# Patient Record
Sex: FEMALE | Race: WHITE | NOT HISPANIC OR LATINO | Employment: OTHER | URBAN - METROPOLITAN AREA
[De-identification: names, ages, dates, MRNs, and addresses within clinical notes are randomized per-mention and may not be internally consistent; named-entity substitution may affect disease eponyms.]

---

## 2018-08-22 ENCOUNTER — IMPORTED ENCOUNTER (OUTPATIENT)
Dept: URBAN - METROPOLITAN AREA CLINIC 43 | Facility: CLINIC | Age: 43
End: 2018-08-22

## 2018-09-05 ENCOUNTER — IMPORTED ENCOUNTER (OUTPATIENT)
Dept: URBAN - METROPOLITAN AREA CLINIC 43 | Facility: CLINIC | Age: 43
End: 2018-09-05

## 2018-11-06 ENCOUNTER — IMPORTED ENCOUNTER (OUTPATIENT)
Dept: URBAN - METROPOLITAN AREA CLINIC 43 | Facility: CLINIC | Age: 43
End: 2018-11-06

## 2018-12-12 ENCOUNTER — IMPORTED ENCOUNTER (OUTPATIENT)
Dept: URBAN - METROPOLITAN AREA CLINIC 43 | Facility: CLINIC | Age: 43
End: 2018-12-12

## 2018-12-12 PROBLEM — H25.13: Noted: 2018-12-12

## 2018-12-12 PROBLEM — H16.223: Noted: 2018-12-12

## 2018-12-13 ENCOUNTER — PREPPED CHART (OUTPATIENT)
Dept: URBAN - METROPOLITAN AREA CLINIC 32 | Facility: CLINIC | Age: 43
End: 2018-12-13

## 2020-04-18 ASSESSMENT — TONOMETRY
OD_IOP_MMHG: 13.0
OS_IOP_MMHG: 12.0

## 2020-04-18 ASSESSMENT — VISUAL ACUITY
OS_SC: 20/200
OD_CC: J1+
OD_SC: J1+
OS_CC: J1+
OS_SC: J1+
OD_CC: 20/20
OS_CC: 20/20
OD_SC: 20/200

## 2020-04-18 ASSESSMENT — KERATOMETRY
OS_AXISANGLE_DEGREES: 85
OD_AXISANGLE_DEGREES: 92
OD_K2POWER_DIOPTERS: 45.5
OS_K2POWER_DIOPTERS: 45.25
OS_K1POWER_DIOPTERS: 44.5
OD_AXISANGLE2_DEGREES: 2
OD_K1POWER_DIOPTERS: 44.75
OS_AXISANGLE2_DEGREES: 175

## 2020-08-21 ASSESSMENT — KERATOMETRY
OD_AXISANGLE_DEGREES: 92
OS_AXISANGLE2_DEGREES: 175
OS_K2POWER_DIOPTERS: 45.25
OD_K2POWER_DIOPTERS: 45.5
OD_K1POWER_DIOPTERS: 44.75
OS_K1POWER_DIOPTERS: 44.5
OS_AXISANGLE_DEGREES: 85
OD_AXISANGLE2_DEGREES: 2

## 2020-08-24 ENCOUNTER — CONTACT LENS EXAM ESTABLISHED (OUTPATIENT)
Dept: URBAN - METROPOLITAN AREA CLINIC 32 | Facility: CLINIC | Age: 45
End: 2020-08-24

## 2020-08-24 DIAGNOSIS — H25.13: ICD-10-CM

## 2020-08-24 PROCEDURE — 92014 COMPRE OPH EXAM EST PT 1/>: CPT

## 2020-08-24 PROCEDURE — 92015 DETERMINE REFRACTIVE STATE: CPT

## 2020-08-24 PROCEDURE — 92310-1 LEVEL 1 CONTACT LENS MANAGEMENT

## 2020-08-24 ASSESSMENT — KERATOMETRY
OD_AXISANGLE2_DEGREES: 96
OD_AXISANGLE_DEGREES: 92
OS_AXISANGLE2_DEGREES: 175
OS_K2POWER_DIOPTERS: 45.25
OD_K1POWER_DIOPTERS: 44.75
OD_AXISANGLE2_DEGREES: 2
OS_AXISANGLE_DEGREES: 174
OD_K2POWER_DIOPTERS: 45.5
OS_AXISANGLE2_DEGREES: 84
OS_AXISANGLE_DEGREES: 85
OS_K1POWER_DIOPTERS: 44.5
OS_K2POWER_DIOPTERS: 45.5
OD_AXISANGLE_DEGREES: 6

## 2020-08-24 ASSESSMENT — VISUAL ACUITY
OS_SC: J1
OD_SC: 20/200
OD_SC: J1
OS_SC: 20/200

## 2020-08-24 ASSESSMENT — PACHYMETRY
OS_CT_UM: 515
OD_CT_UM: 538

## 2020-08-24 ASSESSMENT — TONOMETRY
OS_IOP_MMHG: 18
OD_IOP_MMHG: 16

## 2022-07-19 ENCOUNTER — COMPREHENSIVE EXAM (OUTPATIENT)
Dept: URBAN - METROPOLITAN AREA CLINIC 32 | Facility: CLINIC | Age: 47
End: 2022-07-19

## 2022-07-19 DIAGNOSIS — H25.13: ICD-10-CM

## 2022-07-19 DIAGNOSIS — H16.223: ICD-10-CM

## 2022-07-19 DIAGNOSIS — H52.13: ICD-10-CM

## 2022-07-19 PROCEDURE — 92310-1 LEVEL 1 CONTACT LENS MANAGEMENT

## 2022-07-19 PROCEDURE — 92015 DETERMINE REFRACTIVE STATE: CPT

## 2022-07-19 PROCEDURE — 92014 COMPRE OPH EXAM EST PT 1/>: CPT

## 2022-07-19 ASSESSMENT — VISUAL ACUITY
OD_CC: J3
OS_CC: 20/20
OS_CC: J2
OD_CC: 20/20

## 2022-07-19 ASSESSMENT — KERATOMETRY
OS_AXISANGLE_DEGREES: 174
OD_AXISANGLE_DEGREES: 6
OS_K2POWER_DIOPTERS: 45.25
OS_AXISANGLE2_DEGREES: 84
OD_K1POWER_DIOPTERS: 44.75
OD_K2POWER_DIOPTERS: 45.5
OD_AXISANGLE2_DEGREES: 2
OS_K1POWER_DIOPTERS: 44.5
OS_AXISANGLE_DEGREES: 85
OS_K2POWER_DIOPTERS: 45.5
OS_AXISANGLE2_DEGREES: 175
OD_AXISANGLE_DEGREES: 92
OD_AXISANGLE2_DEGREES: 96

## 2022-07-19 ASSESSMENT — TONOMETRY
OS_IOP_MMHG: 15
OD_IOP_MMHG: 14

## 2022-07-26 ENCOUNTER — APPOINTMENT (RX ONLY)
Dept: URBAN - METROPOLITAN AREA CLINIC 126 | Facility: CLINIC | Age: 47
Setting detail: DERMATOLOGY
End: 2022-07-26

## 2022-07-26 DIAGNOSIS — L71.0 PERIORAL DERMATITIS: ICD-10-CM | Status: INADEQUATELY CONTROLLED

## 2022-07-26 DIAGNOSIS — Z71.89 OTHER SPECIFIED COUNSELING: ICD-10-CM

## 2022-07-26 PROCEDURE — 99204 OFFICE O/P NEW MOD 45 MIN: CPT

## 2022-07-26 PROCEDURE — ? SUNSCREEN RECOMMENDATIONS

## 2022-07-26 PROCEDURE — ? PRESCRIPTION MEDICATION MANAGEMENT

## 2022-07-26 PROCEDURE — ? PRESCRIPTION

## 2022-07-26 PROCEDURE — ? COUNSELING

## 2022-07-26 RX ORDER — SULFACETAMIDE SODIUM, SULFUR 98; 48 MG/G; MG/G
LIQUID TOPICAL
Qty: 285 | Refills: 0 | Status: ERX | COMMUNITY
Start: 2022-07-26

## 2022-07-26 RX ORDER — DOXYCYCLINE 40 MG/1
CAPSULE ORAL QD
Qty: 30 | Refills: 3 | Status: ERX | COMMUNITY
Start: 2022-07-26

## 2022-07-26 RX ADMIN — DOXYCYCLINE: 40 CAPSULE ORAL at 00:00

## 2022-07-26 RX ADMIN — SULFACETAMIDE SODIUM, SULFUR: 98; 48 LIQUID TOPICAL at 00:00

## 2022-07-26 ASSESSMENT — LOCATION ZONE DERM: LOCATION ZONE: FACE

## 2022-07-26 ASSESSMENT — LOCATION SIMPLE DESCRIPTION DERM: LOCATION SIMPLE: LEFT CHEEK

## 2022-07-26 ASSESSMENT — LOCATION DETAILED DESCRIPTION DERM: LOCATION DETAILED: LEFT INFERIOR CENTRAL MALAR CHEEK

## 2022-07-26 NOTE — PROCEDURE: SUNSCREEN RECOMMENDATIONS
Detail Level: Simple
Products Recommended: Saray Ding MD
General Sunscreen Counseling: I recommended a broad spectrum sunscreen with a SPF of 30 or higher. I explained that SPF 30 sunscreens block approximately 97 percent of the sun's harmful rays. Sunscreens should be applied at least 15 minutes prior to expected sun exposure and then every 2 hours after that as long as sun exposure continues. If swimming or exercising sunscreen should be reapplied every 45 minutes to an hour after getting wet or sweating. One ounce, or the equivalent of a shot glass full of sunscreen, is adequate to protect the skin not covered by a bathing suit. I also recommended a lip balm with a sunscreen as well. Sun protective clothing can be used in lieu of sunscreen but must be worn the entire time you are exposed to the sun's rays.

## 2022-07-26 NOTE — PROCEDURE: PRESCRIPTION MEDICATION MANAGEMENT
Render In Strict Bullet Format?: No
Plan: Begin oracea 1 pill po daily  and use plexion daily to wash face
Detail Level: Zone
Samples Given: Oracea 40mg and Plexion cleanser
Discontinue Regimen: Steroid cream, skin ceuticals

## 2022-08-09 ENCOUNTER — FOLLOW UP (OUTPATIENT)
Dept: URBAN - METROPOLITAN AREA CLINIC 32 | Facility: CLINIC | Age: 47
End: 2022-08-09

## 2022-08-09 DIAGNOSIS — H52.13: ICD-10-CM

## 2022-08-09 PROCEDURE — 92015GRNC REFRACTION GLASSES RECHECK - NO CHARGE

## 2022-08-09 ASSESSMENT — KERATOMETRY
OD_AXISANGLE2_DEGREES: 5
OD_AXISANGLE_DEGREES: 92
OS_K2POWER_DIOPTERS: 45.5
OD_K2POWER_DIOPTERS: 45.25
OD_K2POWER_DIOPTERS: 45.5
OD_AXISANGLE2_DEGREES: 2
OS_AXISANGLE2_DEGREES: 84
OS_K1POWER_DIOPTERS: 45.50
OD_AXISANGLE_DEGREES: 6
OS_AXISANGLE_DEGREES: 174
OS_K2POWER_DIOPTERS: 45.25
OD_AXISANGLE2_DEGREES: 96
OS_AXISANGLE2_DEGREES: 175
OS_K2POWER_DIOPTERS: 45.00
OD_K1POWER_DIOPTERS: 45.75
OD_AXISANGLE_DEGREES: 95
OS_AXISANGLE_DEGREES: 85
OD_K1POWER_DIOPTERS: 44.75
OS_K1POWER_DIOPTERS: 44.5

## 2022-08-09 ASSESSMENT — VISUAL ACUITY
OD_CC: 20/20
OS_CC: 20/20

## 2022-08-09 ASSESSMENT — TONOMETRY
OS_IOP_MMHG: 10
OD_IOP_MMHG: 10

## 2023-04-27 ENCOUNTER — APPOINTMENT (RX ONLY)
Dept: URBAN - METROPOLITAN AREA CLINIC 124 | Facility: CLINIC | Age: 48
Setting detail: DERMATOLOGY
End: 2023-04-27

## 2023-04-27 DIAGNOSIS — L71.0 PERIORAL DERMATITIS: ICD-10-CM

## 2023-04-27 PROCEDURE — ? PRESCRIPTION

## 2023-04-27 PROCEDURE — ? OTHER

## 2023-04-27 PROCEDURE — ? COUNSELING

## 2023-04-27 PROCEDURE — 99213 OFFICE O/P EST LOW 20 MIN: CPT

## 2023-04-27 RX ORDER — CLINDAMYCIN PHOSPHATE 10 MG/ML
SOLUTION TOPICAL BID
Qty: 60 | Refills: 2 | Status: ERX | COMMUNITY
Start: 2023-04-27

## 2023-04-27 RX ADMIN — CLINDAMYCIN PHOSPHATE: 10 SOLUTION TOPICAL at 00:00

## 2023-04-27 ASSESSMENT — LOCATION SIMPLE DESCRIPTION DERM
LOCATION SIMPLE: CHIN
LOCATION SIMPLE: RIGHT CHEEK

## 2023-04-27 ASSESSMENT — LOCATION ZONE DERM: LOCATION ZONE: FACE

## 2023-04-27 ASSESSMENT — LOCATION DETAILED DESCRIPTION DERM
LOCATION DETAILED: RIGHT CENTRAL BUCCAL CHEEK
LOCATION DETAILED: RIGHT INFERIOR MEDIAL MALAR CHEEK
LOCATION DETAILED: LEFT CHIN

## 2023-04-27 NOTE — PROCEDURE: OTHER
Detail Level: Detailed
Render Risk Assessment In Note?: no
Note Text (......Xxx Chief Complaint.): This diagnosis correlates with the
Other (Free Text): States she does not want to take oral antibiotics at this time.

## 2024-05-07 ENCOUNTER — APPOINTMENT (RX ONLY)
Dept: URBAN - METROPOLITAN AREA CLINIC 128 | Facility: CLINIC | Age: 49
Setting detail: DERMATOLOGY
End: 2024-05-07

## 2024-05-07 DIAGNOSIS — L71.0 PERIORAL DERMATITIS: ICD-10-CM | Status: INADEQUATELY CONTROLLED

## 2024-05-07 DIAGNOSIS — D18.0 HEMANGIOMA: ICD-10-CM

## 2024-05-07 PROBLEM — D18.01 HEMANGIOMA OF SKIN AND SUBCUTANEOUS TISSUE: Status: ACTIVE | Noted: 2024-05-07

## 2024-05-07 PROCEDURE — ? COUNSELING

## 2024-05-07 PROCEDURE — 99213 OFFICE O/P EST LOW 20 MIN: CPT

## 2024-05-07 PROCEDURE — ? PRESCRIPTION

## 2024-05-07 PROCEDURE — ? ADDITIONAL NOTES

## 2024-05-07 RX ORDER — SODIUM SULFACETAMIDE AND SULFUR 100; 10 MG/G; MG/G
LIQUID TOPICAL
Qty: 1 | Refills: 2 | Status: ERX | COMMUNITY
Start: 2024-05-07

## 2024-05-07 RX ORDER — METRONIDAZOLE 7.5 MG/G
1 CREAM TOPICAL
Qty: 45 | Refills: 3 | Status: ERX | COMMUNITY
Start: 2024-05-07

## 2024-05-07 RX ADMIN — METRONIDAZOLE 1: 7.5 CREAM TOPICAL at 00:00

## 2024-05-07 RX ADMIN — SODIUM SULFACETAMIDE AND SULFUR: 100; 10 LIQUID TOPICAL at 00:00

## 2024-05-07 ASSESSMENT — LOCATION ZONE DERM
LOCATION ZONE: SCALP
LOCATION ZONE: LIP

## 2024-05-07 ASSESSMENT — LOCATION DETAILED DESCRIPTION DERM
LOCATION DETAILED: LEFT CENTRAL FRONTAL SCALP
LOCATION DETAILED: PHILTRUM

## 2024-05-07 ASSESSMENT — LOCATION SIMPLE DESCRIPTION DERM
LOCATION SIMPLE: UPPER LIP
LOCATION SIMPLE: LEFT SCALP

## 2024-05-07 NOTE — PROCEDURE: ADDITIONAL NOTES
Additional Notes: - D/C Clindamycin\\n- Hold all skin care products for now\\n-Once rash is calm, she can reintroduce skin care products back in one at a time\\n- Pt was using a red light at home to try to treat rash . Advised to hold for now.
Detail Level: Simple
Render Risk Assessment In Note?: no

## 2024-05-22 ENCOUNTER — RX ONLY (OUTPATIENT)
Age: 49
Setting detail: RX ONLY
End: 2024-05-22

## 2024-05-22 RX ORDER — SODIUM SULFACETAMIDE, SULFUR 50; 100 MG/G; MG/G
LOTION TOPICAL
Qty: 170 | Refills: 2 | Status: ERX | COMMUNITY
Start: 2024-05-22

## 2024-06-04 ENCOUNTER — APPOINTMENT (RX ONLY)
Dept: URBAN - METROPOLITAN AREA CLINIC 128 | Facility: CLINIC | Age: 49
Setting detail: DERMATOLOGY
End: 2024-06-04

## 2024-06-04 DIAGNOSIS — L71.0 PERIORAL DERMATITIS: ICD-10-CM

## 2024-06-04 DIAGNOSIS — L70.0 ACNE VULGARIS: ICD-10-CM

## 2024-06-04 PROCEDURE — ? PRESCRIPTION MEDICATION MANAGEMENT

## 2024-06-04 PROCEDURE — ? COUNSELING

## 2024-06-04 PROCEDURE — ? OTHER

## 2024-06-04 PROCEDURE — ? PRESCRIPTION

## 2024-06-04 PROCEDURE — 99212 OFFICE O/P EST SF 10 MIN: CPT

## 2024-06-04 RX ORDER — SULFACETAMIDE SODIUM AND SULFUR 100; 50 MG/G; MG/G
CREAM TOPICAL
Qty: 170 | Refills: 0 | Status: ERX | COMMUNITY
Start: 2024-06-04

## 2024-06-04 RX ORDER — SODIUM SULFACETAMIDE AND SULFUR 100; 10 MG/G; MG/G
LIQUID TOPICAL
Qty: 170 | Refills: 2 | Status: CANCELLED

## 2024-06-04 RX ADMIN — SULFACETAMIDE SODIUM AND SULFUR: 100; 50 CREAM TOPICAL at 00:00

## 2024-06-04 ASSESSMENT — LOCATION ZONE DERM
LOCATION ZONE: LIP
LOCATION ZONE: FACE

## 2024-06-04 ASSESSMENT — LOCATION DETAILED DESCRIPTION DERM
LOCATION DETAILED: INFERIOR MID FOREHEAD
LOCATION DETAILED: RIGHT UPPER CUTANEOUS LIP

## 2024-06-04 ASSESSMENT — LOCATION SIMPLE DESCRIPTION DERM
LOCATION SIMPLE: INFERIOR FOREHEAD
LOCATION SIMPLE: RIGHT LIP

## 2024-06-04 NOTE — PROCEDURE: COUNSELING
Detail Level: Simple
Include Pregnancy/Lactation Warning?: No
Azithromycin Counseling:  I discussed with the patient the risks of azithromycin including but not limited to GI upset, allergic reaction, drug rash, diarrhea, and yeast infections.
Dapsone Pregnancy And Lactation Text: This medication is Pregnancy Category C and is not considered safe during pregnancy or breast feeding.
High Dose Vitamin A Counseling: Side effects reviewed, pt to contact office should one occur.
Isotretinoin Counseling: Patient should get monthly blood tests, not donate blood, not drive at night if vision affected, not share medication, and not undergo elective surgery for 6 months after tx completed. Side effects reviewed, pt to contact office should one occur.
Birth Control Pills Pregnancy And Lactation Text: This medication should be avoided if pregnant and for the first 30 days post-partum.
Sarecycline Pregnancy And Lactation Text: This medication is Pregnancy Category D and not consider safe during pregnancy. It is also excreted in breast milk.
Azelaic Acid Counseling: Patient counseled that medicine may cause skin irritation and to avoid applying near the eyes.  In the event of skin irritation, the patient was advised to reduce the amount of the drug applied or use it less frequently.   The patient verbalized understanding of the proper use and possible adverse effects of azelaic acid.  All of the patient's questions and concerns were addressed.
Tazorac Pregnancy And Lactation Text: This medication is not safe during pregnancy. It is unknown if this medication is excreted in breast milk.
Benzoyl Peroxide Pregnancy And Lactation Text: This medication is Pregnancy Category C. It is unknown if benzoyl peroxide is excreted in breast milk.
Topical Sulfur Applications Counseling: Topical Sulfur Counseling: Patient counseled that this medication may cause skin irritation or allergic reactions.  In the event of skin irritation, the patient was advised to reduce the amount of the drug applied or use it less frequently.   The patient verbalized understanding of the proper use and possible adverse effects of topical sulfur application.  All of the patient's questions and concerns were addressed.
Doxycycline Counseling:  Patient counseled regarding possible photosensitivity and increased risk for sunburn.  Patient instructed to avoid sunlight, if possible.  When exposed to sunlight, patients should wear protective clothing, sunglasses, and sunscreen.  The patient was instructed to call the office immediately if the following severe adverse effects occur:  hearing changes, easy bruising/bleeding, severe headache, or vision changes.  The patient verbalized understanding of the proper use and possible adverse effects of doxycycline.  All of the patient's questions and concerns were addressed.
Azithromycin Pregnancy And Lactation Text: This medication is considered safe during pregnancy and is also secreted in breast milk.
High Dose Vitamin A Pregnancy And Lactation Text: High dose vitamin A therapy is contraindicated during pregnancy and breast feeding.
Tetracycline Counseling: Patient counseled regarding possible photosensitivity and increased risk for sunburn.  Patient instructed to avoid sunlight, if possible.  When exposed to sunlight, patients should wear protective clothing, sunglasses, and sunscreen.  The patient was instructed to call the office immediately if the following severe adverse effects occur:  hearing changes, easy bruising/bleeding, severe headache, or vision changes.  The patient verbalized understanding of the proper use and possible adverse effects of tetracycline.  All of the patient's questions and concerns were addressed. Patient understands to avoid pregnancy while on therapy due to potential birth defects.
Winlevi Counseling:  I discussed with the patient the risks of topical clascoterone including but not limited to erythema, scaling, itching, and stinging. Patient voiced their understanding.
Erythromycin Counseling:  I discussed with the patient the risks of erythromycin including but not limited to GI upset, allergic reaction, drug rash, diarrhea, increase in liver enzymes, and yeast infections.
Bactrim Pregnancy And Lactation Text: This medication is Pregnancy Category D and is known to cause fetal risk.  It is also excreted in breast milk.
Aklief counseling:  Patient advised to apply a pea-sized amount only at bedtime and wait 30 minutes after washing their face before applying.  If too drying, patient may add a non-comedogenic moisturizer.  The most commonly reported side effects including irritation, redness, scaling, dryness, stinging, burning, itching, and increased risk of sunburn.  The patient verbalized understanding of the proper use and possible adverse effects of retinoids.  All of the patient's questions and concerns were addressed.
Topical Retinoid Pregnancy And Lactation Text: This medication is Pregnancy Category C. It is unknown if this medication is excreted in breast milk.
Tazorac Counseling:  Patient advised that medication is irritating and drying.  Patient may need to apply sparingly and wash off after an hour before eventually leaving it on overnight.  The patient verbalized understanding of the proper use and possible adverse effects of tazorac.  All of the patient's questions and concerns were addressed.
Sarecycline Counseling: Patient advised regarding possible photosensitivity and discoloration of the teeth, skin, lips, tongue and gums.  Patient instructed to avoid sunlight, if possible.  When exposed to sunlight, patients should wear protective clothing, sunglasses, and sunscreen.  The patient was instructed to call the office immediately if the following severe adverse effects occur:  hearing changes, easy bruising/bleeding, severe headache, or vision changes.  The patient verbalized understanding of the proper use and possible adverse effects of sarecycline.  All of the patient's questions and concerns were addressed.
Aklief Pregnancy And Lactation Text: It is unknown if this medication is safe to use during pregnancy.  It is unknown if this medication is excreted in breast milk.  Breastfeeding women should use the topical cream on the smallest area of the skin for the shortest time needed while breastfeeding.  Do not apply to nipple and areola.
Winlevi Pregnancy And Lactation Text: This medication is considered safe during pregnancy and breastfeeding.
Birth Control Pills Counseling: Birth Control Pill Counseling: I discussed with the patient the potential side effects of OCPs including but not limited to increased risk of stroke, heart attack, thrombophlebitis, deep venous thrombosis, hepatic adenomas, breast changes, GI upset, headaches, and depression.  The patient verbalized understanding of the proper use and possible adverse effects of OCPs. All of the patient's questions and concerns were addressed.
Erythromycin Pregnancy And Lactation Text: This medication is Pregnancy Category B and is considered safe during pregnancy. It is also excreted in breast milk.
Spironolactone Counseling: Patient advised regarding risks of diarrhea, abdominal pain, hyperkalemia, birth defects (for female patients), liver toxicity and renal toxicity. The patient may need blood work to monitor liver and kidney function and potassium levels while on therapy. The patient verbalized understanding of the proper use and possible adverse effects of spironolactone.  All of the patient's questions and concerns were addressed.
Topical Clindamycin Counseling: Patient counseled that this medication may cause skin irritation or allergic reactions.  In the event of skin irritation, the patient was advised to reduce the amount of the drug applied or use it less frequently.   The patient verbalized understanding of the proper use and possible adverse effects of clindamycin.  All of the patient's questions and concerns were addressed.
Isotretinoin Pregnancy And Lactation Text: This medication is Pregnancy Category X and is considered extremely dangerous during pregnancy. It is unknown if it is excreted in breast milk.
Azelaic Acid Pregnancy And Lactation Text: This medication is considered safe during pregnancy and breast feeding.
Dapsone Counseling: I discussed with the patient the risks of dapsone including but not limited to hemolytic anemia, agranulocytosis, rashes, methemoglobinemia, kidney failure, peripheral neuropathy, headaches, GI upset, and liver toxicity.  Patients who start dapsone require monitoring including baseline LFTs and weekly CBCs for the first month, then every month thereafter.  The patient verbalized understanding of the proper use and possible adverse effects of dapsone.  All of the patient's questions and concerns were addressed.
Benzoyl Peroxide Counseling: Patient counseled that medicine may cause skin irritation and bleach clothing.  In the event of skin irritation, the patient was advised to reduce the amount of the drug applied or use it less frequently.   The patient verbalized understanding of the proper use and possible adverse effects of benzoyl peroxide.  All of the patient's questions and concerns were addressed.
Spironolactone Pregnancy And Lactation Text: This medication can cause feminization of the male fetus and should be avoided during pregnancy. The active metabolite is also found in breast milk.
Topical Clindamycin Pregnancy And Lactation Text: This medication is Pregnancy Category B and is considered safe during pregnancy. It is unknown if it is excreted in breast milk.
Detail Level: Zone
Minocycline Counseling: Patient advised regarding possible photosensitivity and discoloration of the teeth, skin, lips, tongue and gums.  Patient instructed to avoid sunlight, if possible.  When exposed to sunlight, patients should wear protective clothing, sunglasses, and sunscreen.  The patient was instructed to call the office immediately if the following severe adverse effects occur:  hearing changes, easy bruising/bleeding, severe headache, or vision changes.  The patient verbalized understanding of the proper use and possible adverse effects of minocycline.  All of the patient's questions and concerns were addressed.
Bactrim Counseling:  I discussed with the patient the risks of sulfa antibiotics including but not limited to GI upset, allergic reaction, drug rash, diarrhea, dizziness, photosensitivity, and yeast infections.  Rarely, more serious reactions can occur including but not limited to aplastic anemia, agranulocytosis, methemoglobinemia, blood dyscrasias, liver or kidney failure, lung infiltrates or desquamative/blistering drug rashes.
Topical Retinoid counseling:  Patient advised to apply a pea-sized amount only at bedtime and wait 30 minutes after washing their face before applying.  If too drying, patient may add a non-comedogenic moisturizer. The patient verbalized understanding of the proper use and possible adverse effects of retinoids.  All of the patient's questions and concerns were addressed.
Doxycycline Pregnancy And Lactation Text: This medication is Pregnancy Category D and not consider safe during pregnancy. It is also excreted in breast milk but is considered safe for shorter treatment courses.
Topical Sulfur Applications Pregnancy And Lactation Text: This medication is Pregnancy Category C and has an unknown safety profile during pregnancy. It is unknown if this topical medication is excreted in breast milk.

## 2024-06-04 NOTE — PROCEDURE: PRESCRIPTION MEDICATION MANAGEMENT
Initiate Treatment: Patient has RX Clindamycin topical that she can use to spot treat
Render In Strict Bullet Format?: No
Detail Level: Zone

## 2024-06-04 NOTE — PROCEDURE: OTHER
Other (Free Text): FU prn
Detail Level: Detailed
Note Text (......Xxx Chief Complaint.): This diagnosis correlates with the
Render Risk Assessment In Note?: no

## 2024-10-25 ENCOUNTER — COMPREHENSIVE EXAM (OUTPATIENT)
Dept: URBAN - METROPOLITAN AREA CLINIC 32 | Facility: CLINIC | Age: 49
End: 2024-10-25

## 2024-10-25 DIAGNOSIS — H52.13: ICD-10-CM

## 2024-10-25 DIAGNOSIS — H16.223: ICD-10-CM

## 2024-10-25 PROCEDURE — 92310-3 LEVEL 3 SOFT LENS UPDATE

## 2024-10-25 PROCEDURE — 92015 DETERMINE REFRACTIVE STATE: CPT

## 2024-10-25 PROCEDURE — 92014 COMPRE OPH EXAM EST PT 1/>: CPT
